# Patient Record
Sex: MALE | Race: BLACK OR AFRICAN AMERICAN | NOT HISPANIC OR LATINO | RURAL
[De-identification: names, ages, dates, MRNs, and addresses within clinical notes are randomized per-mention and may not be internally consistent; named-entity substitution may affect disease eponyms.]

---

## 2024-08-09 ENCOUNTER — ANESTHESIA EVENT (OUTPATIENT)
Dept: GASTROENTEROLOGY | Facility: HOSPITAL | Age: 79
End: 2024-08-09
Payer: MEDICARE

## 2024-08-09 ENCOUNTER — HOSPITAL ENCOUNTER (EMERGENCY)
Facility: HOSPITAL | Age: 79
Discharge: HOME | End: 2024-08-09
Attending: FAMILY MEDICINE
Payer: MEDICARE

## 2024-08-09 ENCOUNTER — ANESTHESIA (OUTPATIENT)
Dept: GASTROENTEROLOGY | Facility: HOSPITAL | Age: 79
End: 2024-08-09
Payer: MEDICARE

## 2024-08-09 VITALS
RESPIRATION RATE: 20 BRPM | WEIGHT: 180 LBS | DIASTOLIC BLOOD PRESSURE: 80 MMHG | TEMPERATURE: 98 F | OXYGEN SATURATION: 97 % | HEART RATE: 133 BPM | BODY MASS INDEX: 25.77 KG/M2 | HEIGHT: 70 IN | SYSTOLIC BLOOD PRESSURE: 135 MMHG

## 2024-08-09 DIAGNOSIS — K94.23 PEG TUBE MALFUNCTION: ICD-10-CM

## 2024-08-09 DIAGNOSIS — T85.528A DISLODGED GASTROSTOMY TUBE: Primary | ICD-10-CM

## 2024-08-09 LAB — GLUCOSE SERPL-MCNC: 85 MG/DL (ref 70–105)

## 2024-08-09 PROCEDURE — 27000716 HC OXISENSOR PROBE, ANY SIZE: Performed by: NURSE ANESTHETIST, CERTIFIED REGISTERED

## 2024-08-09 PROCEDURE — 96375 TX/PRO/DX INJ NEW DRUG ADDON: CPT

## 2024-08-09 PROCEDURE — 96374 THER/PROPH/DIAG INJ IV PUSH: CPT

## 2024-08-09 PROCEDURE — 27000284 HC CANNULA NASAL: Performed by: NURSE ANESTHETIST, CERTIFIED REGISTERED

## 2024-08-09 PROCEDURE — 25000003 PHARM REV CODE 250: Performed by: NURSE ANESTHETIST, CERTIFIED REGISTERED

## 2024-08-09 PROCEDURE — 99222 1ST HOSP IP/OBS MODERATE 55: CPT | Mod: 25,,, | Performed by: INTERNAL MEDICINE

## 2024-08-09 PROCEDURE — 63600175 PHARM REV CODE 636 W HCPCS: Performed by: FAMILY MEDICINE

## 2024-08-09 PROCEDURE — 63600175 PHARM REV CODE 636 W HCPCS: Performed by: NURSE ANESTHETIST, CERTIFIED REGISTERED

## 2024-08-09 PROCEDURE — 99285 EMERGENCY DEPT VISIT HI MDM: CPT | Mod: 25

## 2024-08-09 PROCEDURE — 43246 EGD PLACE GASTROSTOMY TUBE: CPT | Performed by: INTERNAL MEDICINE

## 2024-08-09 PROCEDURE — 82962 GLUCOSE BLOOD TEST: CPT

## 2024-08-09 PROCEDURE — 43246 EGD PLACE GASTROSTOMY TUBE: CPT | Mod: ,,, | Performed by: INTERNAL MEDICINE

## 2024-08-09 RX ORDER — PROPOFOL 10 MG/ML
VIAL (ML) INTRAVENOUS
Status: DISCONTINUED | OUTPATIENT
Start: 2024-08-09 | End: 2024-08-09

## 2024-08-09 RX ORDER — FUROSEMIDE 40 MG/1
40 TABLET ORAL DAILY
COMMUNITY
Start: 2024-07-12

## 2024-08-09 RX ORDER — RIVASTIGMINE 4.6 MG/24H
1 PATCH, EXTENDED RELEASE TRANSDERMAL
COMMUNITY
Start: 2024-07-09

## 2024-08-09 RX ORDER — ADHESIVE BANDAGE
30 BANDAGE TOPICAL DAILY PRN
COMMUNITY

## 2024-08-09 RX ORDER — ONDANSETRON HYDROCHLORIDE 2 MG/ML
4 INJECTION, SOLUTION INTRAVENOUS
Status: COMPLETED | OUTPATIENT
Start: 2024-08-09 | End: 2024-08-09

## 2024-08-09 RX ORDER — RIVAROXABAN 20 MG/1
20 TABLET, FILM COATED ORAL DAILY
COMMUNITY
Start: 2024-07-10

## 2024-08-09 RX ORDER — BUSPIRONE HYDROCHLORIDE 5 MG/1
5 TABLET ORAL 2 TIMES DAILY
COMMUNITY
Start: 2024-07-02

## 2024-08-09 RX ORDER — PANTOPRAZOLE SODIUM 40 MG/1
40 TABLET, DELAYED RELEASE ORAL DAILY
COMMUNITY

## 2024-08-09 RX ORDER — MEMANTINE HYDROCHLORIDE 10 MG/1
10 TABLET ORAL 2 TIMES DAILY
COMMUNITY

## 2024-08-09 RX ORDER — SERTRALINE HYDROCHLORIDE 100 MG/1
100 TABLET, FILM COATED ORAL DAILY
COMMUNITY
Start: 2024-05-24

## 2024-08-09 RX ORDER — OXYBUTYNIN CHLORIDE 5 MG/1
5 TABLET ORAL 2 TIMES DAILY
COMMUNITY
Start: 2024-07-08

## 2024-08-09 RX ORDER — HYDROCODONE BITARTRATE AND ACETAMINOPHEN 7.5; 325 MG/1; MG/1
1 TABLET ORAL EVERY 4 HOURS PRN
COMMUNITY

## 2024-08-09 RX ORDER — CEFAZOLIN SODIUM 1 G/3ML
INJECTION, POWDER, FOR SOLUTION INTRAMUSCULAR; INTRAVENOUS
Status: DISCONTINUED | OUTPATIENT
Start: 2024-08-09 | End: 2024-08-09

## 2024-08-09 RX ORDER — LIDOCAINE HYDROCHLORIDE 20 MG/ML
INJECTION, SOLUTION EPIDURAL; INFILTRATION; INTRACAUDAL; PERINEURAL
Status: DISCONTINUED | OUTPATIENT
Start: 2024-08-09 | End: 2024-08-09

## 2024-08-09 RX ORDER — ETOMIDATE 2 MG/ML
INJECTION INTRAVENOUS
Status: DISCONTINUED | OUTPATIENT
Start: 2024-08-09 | End: 2024-08-09

## 2024-08-09 RX ORDER — MORPHINE SULFATE 4 MG/ML
4 INJECTION, SOLUTION INTRAMUSCULAR; INTRAVENOUS
Status: COMPLETED | OUTPATIENT
Start: 2024-08-09 | End: 2024-08-09

## 2024-08-09 RX ADMIN — PROPOFOL 20 MG: 10 INJECTION, EMULSION INTRAVENOUS at 03:08

## 2024-08-09 RX ADMIN — LIDOCAINE HYDROCHLORIDE 50 MG: 20 INJECTION, SOLUTION INTRAVENOUS at 03:08

## 2024-08-09 RX ADMIN — MORPHINE SULFATE 4 MG: 4 INJECTION, SOLUTION INTRAMUSCULAR; INTRAVENOUS at 04:08

## 2024-08-09 RX ADMIN — CEFAZOLIN SODIUM 2000 G: 1 INJECTION, POWDER, FOR SOLUTION INTRAMUSCULAR; INTRAVENOUS at 03:08

## 2024-08-09 RX ADMIN — ONDANSETRON 4 MG: 2 INJECTION INTRAMUSCULAR; INTRAVENOUS at 04:08

## 2024-08-09 RX ADMIN — ETOMIDATE 4 MG: 2 INJECTION INTRAVENOUS at 03:08

## 2024-08-09 RX ADMIN — SODIUM CHLORIDE: 9 INJECTION, SOLUTION INTRAVENOUS at 02:08

## 2024-08-09 NOTE — ED NOTES
[facility] present to transport patient back to [facility][1].  Patient secured to stretcher and transferred to ambulance.

## 2024-08-09 NOTE — ED NOTES
Assumed care of patient, resting in bed at this time.  No complaints of pain or distress at present.  Patient pulled PEG Tube out at Nursing Home.

## 2024-08-09 NOTE — ED PROVIDER NOTES
Encounter Date: 8/9/2024       History     Chief Complaint   Patient presents with    GI Problem     Ems from [facility] - pulled peg tube out     Patient comes in from local nursing home with having pulled his PEG tube out.  Unsure of the time of the patient's pulling of the PEG tube the nursing home sent to PEG tube with him to see if they could be placed.        Review of patient's allergies indicates:  No Known Allergies  Past Medical History:   Diagnosis Date    Alzheimer's disease, unspecified (CODE)     Congenital anomaly of esophagus     Dementia     Depression     Hiatal hernia     Pulmonary embolism     Vitamin D deficiency disease      History reviewed. No pertinent surgical history.  No family history on file.  Social History     Tobacco Use    Smoking status: Unknown     Review of Systems   Constitutional:  Positive for fatigue. Negative for fever.   HENT: Negative.  Negative for sore throat.    Eyes: Negative.    Respiratory: Negative.  Negative for shortness of breath.    Cardiovascular: Negative.  Negative for chest pain.   Gastrointestinal: Negative.  Negative for nausea.        Patient with a closed area with a PEG tube is   Endocrine: Negative.    Genitourinary: Negative.  Negative for dysuria.   Musculoskeletal: Negative.  Negative for back pain.   Skin: Negative.  Negative for rash.   Allergic/Immunologic: Negative.    Neurological: Negative.  Negative for weakness.   Hematological: Negative.  Does not bruise/bleed easily.   Psychiatric/Behavioral: Negative.         Physical Exam     Initial Vitals [08/09/24 0621]   BP Pulse Resp Temp SpO2   110/71 71 18 97.9 °F (36.6 °C) 96 %      MAP       --         Physical Exam    Constitutional: He appears well-developed and well-nourished.   HENT:   Head: Normocephalic and atraumatic.   Right Ear: External ear normal.   Left Ear: External ear normal.   Nose: Nose normal.   Mouth/Throat: Oropharynx is clear and moist.   Eyes: Conjunctivae and EOM are  normal. Pupils are equal, round, and reactive to light.   Neck: Neck supple.   Normal range of motion.  Cardiovascular:  Normal rate, regular rhythm, normal heart sounds and intact distal pulses.           Pulmonary/Chest: Breath sounds normal.   Abdominal: Abdomen is soft. Bowel sounds are normal.   PEG tube removal with closure of site   Genitourinary:    Prostate and penis normal.     Musculoskeletal:         General: Normal range of motion.      Cervical back: Normal range of motion and neck supple.     Neurological: He is alert and oriented to person, place, and time. He has normal strength and normal reflexes.   Skin: Skin is warm and dry.   Psychiatric: He has a normal mood and affect. His behavior is normal. Judgment and thought content normal.         Medical Screening Exam   See Full Note    ED Course   Procedures  Labs Reviewed   POCT GLUCOSE MONITORING CONTINUOUS       Result Value    POC Glucose 85            Imaging Results    None          Medications   morphine injection 4 mg (4 mg Intravenous Given 8/9/24 1633)   ondansetron injection 4 mg (4 mg Intravenous Given 8/9/24 1631)     Medical Decision Making  Risk  Prescription drug management.                          Medical Decision Making:   Initial Assessment:   Patient comes in from local nursing home with having pulled his PEG tube out.  Unsure of the time of the patient's pulling of the PEG tube the nursing home sent to PEG tube with him to see if they could be placed.        Differential Diagnosis:   Attempted to place a 16 and 20 gauge catheter and was unable to do so.  ED Management:  Will refer the patient to GI             Clinical Impression:   Final diagnoses:  [K94.23] PEG tube malfunction        ED Disposition Condition    Discharge Stable          ED Prescriptions    None       Follow-up Information    None          José Antonio Nolan,   08/13/24 [id number]

## 2024-08-09 NOTE — ED NOTES
GI called and gave report for patient.  They did not have to reopen PEG Site but did have to use scope to get PEG Tube back in.  PEG Tube confirmation per GI Lab

## 2024-08-09 NOTE — ED NOTES
Dr Nolan attempting to put PEG back in place.  Unsuccessful attempt with multiple size tubes, will consult GI this morning.

## 2024-08-09 NOTE — ED NOTES
[facility] request submitted.  Confirmation number [id number].  Facesheet faxed to [facility].

## 2024-08-09 NOTE — ED NOTES
Patient returned from GI Lab per stretcher, moved over to stretcher in hallway.  Patient complaining of pain at present.

## 2024-08-09 NOTE — DISCHARGE INSTRUCTIONS
Procedure Date  8/9/24     Impression  Overall Impression:   Grade A esophagitis in the GE junction  Previous G-tube site in the body of the stomach  The upper third of the esophagus, middle third of the esophagus, cardia, fundus of the stomach, incisura, antrum, duodenal bulb, 1st part of the duodenum and 2nd part of the duodenum appeared normal.  PEG tube placed in the body of the stomach         Recommendation  Follow up with PCP  OK to use PEG without restriction starting now       Outcome of procedure: successful EGD with PEG placement  Disposition: patient to recovery following procedure; discharge to home when appropriate parameters met  Provisions for follow up: please call my office for any unexpected symptoms like chest or abdominal pain or bleeding following your procedure.  Final Diagnosis: dysphagia, PEG dependent

## 2024-08-09 NOTE — CONSULTS
"Gastroenterology Consult Note    Chief Complaint: G-tube dislodged     Consulted by:   Dr. Nolan     HPI:  Dae Gorman is a 79 y.o. AAM with h/o dementia and G-tube dependency that presents from NH after dislodging G-tube. ER and surgery were unable to replace G-tube bedside. Patient unable to provide history.     Past Medical History:   Diagnosis Date    Alzheimer's disease, unspecified (CODE)     Congenital anomaly of esophagus     Dementia     Depression     Hiatal hernia     Pulmonary embolism     Vitamin D deficiency disease      History reviewed. No pertinent surgical history.  No family history on file.    OBJECTIVE:  /73 (BP Location: Right arm, Patient Position: Lying)   Pulse 81   Temp 97.9 °F (36.6 °C) (Oral)   Resp 17   Ht 5' 10" (1.778 m)   Wt 81.6 kg (180 lb)   SpO2 100%   BMI 25.83 kg/m²   GEN: chronically ill appearing, NAD  HEENT: NCAT, MMM  NECK: Supple, no LAD  CV: normal rate, regular rhythm  RESP: CTA bilaterally, unlabored  ABD: NABS, ND, NT, soft, no guarding - prior G-tube site without purulence or bleeding   EXT: No clubbing, cyanosis, or edema.  SKIN: Warm and dry  NEURO: AAO x0. Afocal.    LABS:  Labs reviewed, notable for GFR 89    IMAGING:  No pertinent imaging available     ASSESSMENT:    79 y.o. AAM with h/o dementia and G-tube dependency that presents from NH after dislodging G-tube.     PLAN:    Dislodged G-tube, Dysphagia  - unable to replace at bedside  - dependent on feeding tube  - does take Xarelto - last dose ~24 hour prior with normal renal function  - proceed with EGD with PEG placement today      Thank you for the consult and including me in the care of this patient. Please call me with questions.     Mckinley Mauro MD  Gastroenterology  "